# Patient Record
Sex: FEMALE | Race: BLACK OR AFRICAN AMERICAN | URBAN - METROPOLITAN AREA
[De-identification: names, ages, dates, MRNs, and addresses within clinical notes are randomized per-mention and may not be internally consistent; named-entity substitution may affect disease eponyms.]

---

## 2020-01-03 ENCOUNTER — HISTORICAL (OUTPATIENT)
Dept: OBSTETRICS AND GYNECOLOGY | Facility: HOSPITAL | Age: 45
End: 2020-01-03

## 2025-01-31 ENCOUNTER — HOSPITAL ENCOUNTER (EMERGENCY)
Facility: HOSPITAL | Age: 50
Discharge: HOME | End: 2025-01-31
Attending: EMERGENCY MEDICINE
Payer: MEDICAID

## 2025-01-31 VITALS
SYSTOLIC BLOOD PRESSURE: 104 MMHG | OXYGEN SATURATION: 99 % | TEMPERATURE: 98 F | RESPIRATION RATE: 18 BRPM | HEART RATE: 80 BPM | DIASTOLIC BLOOD PRESSURE: 82 MMHG

## 2025-01-31 DIAGNOSIS — R22.1 MASS OF RIGHT SIDE OF NECK: ICD-10-CM

## 2025-01-31 DIAGNOSIS — Z32.02 NEGATIVE PREGNANCY TEST: Primary | ICD-10-CM

## 2025-01-31 LAB
ALBUMIN SERPL-MCNC: 3.5 G/DL (ref 3.5–5)
ALBUMIN/GLOB SERPL: 0.8 RATIO (ref 1.1–2)
ALP SERPL-CCNC: 81 UNIT/L (ref 40–150)
ALT SERPL-CCNC: 6 UNIT/L (ref 0–55)
ANION GAP SERPL CALC-SCNC: 6 MEQ/L
ANISOCYTOSIS BLD QL SMEAR: ABNORMAL
AST SERPL-CCNC: 13 UNIT/L (ref 5–34)
B-HCG FREE SERPL-ACNC: <2.42 MIU/ML
B-HCG UR QL: NEGATIVE
BASOPHILS # BLD AUTO: 0.02 X10(3)/MCL
BASOPHILS NFR BLD AUTO: 0.4 %
BILIRUB SERPL-MCNC: 0.4 MG/DL
BILIRUB UR QL STRIP.AUTO: NEGATIVE
BUN SERPL-MCNC: 11 MG/DL (ref 7–18.7)
CALCIUM SERPL-MCNC: 8.7 MG/DL (ref 8.4–10.2)
CHLORIDE SERPL-SCNC: 110 MMOL/L (ref 98–107)
CLARITY UR: CLEAR
CO2 SERPL-SCNC: 23 MMOL/L (ref 22–29)
COLOR UR AUTO: YELLOW
CREAT SERPL-MCNC: 0.66 MG/DL (ref 0.55–1.02)
CREAT/UREA NIT SERPL: 17
ELLIPTOCYTOSIS (OHS): SLIGHT
EOSINOPHIL # BLD AUTO: 0.14 X10(3)/MCL (ref 0–0.9)
EOSINOPHIL NFR BLD AUTO: 2.5 %
ERYTHROCYTE [DISTWIDTH] IN BLOOD BY AUTOMATED COUNT: 21 % (ref 11.5–17)
GFR SERPLBLD CREATININE-BSD FMLA CKD-EPI: >60 ML/MIN/1.73/M2
GLOBULIN SER-MCNC: 4.3 GM/DL (ref 2.4–3.5)
GLUCOSE SERPL-MCNC: 93 MG/DL (ref 74–100)
GLUCOSE UR QL STRIP: NEGATIVE
HCT VFR BLD AUTO: 32.5 % (ref 37–47)
HGB BLD-MCNC: 9.1 G/DL (ref 12–16)
HGB UR QL STRIP: NEGATIVE
HYPOCHROMIA BLD QL SMEAR: SLIGHT
IMM GRANULOCYTES # BLD AUTO: 0.01 X10(3)/MCL (ref 0–0.04)
IMM GRANULOCYTES NFR BLD AUTO: 0.2 %
KETONES UR QL STRIP: NEGATIVE
LEUKOCYTE ESTERASE UR QL STRIP: NEGATIVE
LYMPHOCYTES # BLD AUTO: 1.31 X10(3)/MCL (ref 0.6–4.6)
LYMPHOCYTES NFR BLD AUTO: 23.8 %
MCH RBC QN AUTO: 17.6 PG (ref 27–31)
MCHC RBC AUTO-ENTMCNC: 28 G/DL (ref 33–36)
MCV RBC AUTO: 62.9 FL (ref 80–94)
MICROCYTES BLD QL SMEAR: SLIGHT
MONOCYTES # BLD AUTO: 0.39 X10(3)/MCL (ref 0.1–1.3)
MONOCYTES NFR BLD AUTO: 7.1 %
NEUTROPHILS # BLD AUTO: 3.63 X10(3)/MCL (ref 2.1–9.2)
NEUTROPHILS NFR BLD AUTO: 66 %
NITRITE UR QL STRIP: NEGATIVE
NRBC BLD AUTO-RTO: 0 %
PH UR STRIP: 5.5 [PH]
PLATELET # BLD AUTO: 284 X10(3)/MCL (ref 130–400)
PLATELET # BLD EST: ADEQUATE 10*3/UL
PMV BLD AUTO: 9.2 FL (ref 7.4–10.4)
POIKILOCYTOSIS BLD QL SMEAR: SLIGHT
POTASSIUM SERPL-SCNC: 3.8 MMOL/L (ref 3.5–5.1)
PROT SERPL-MCNC: 7.8 GM/DL (ref 6.4–8.3)
PROT UR QL STRIP: NEGATIVE
RBC # BLD AUTO: 5.17 X10(6)/MCL (ref 4.2–5.4)
RBC MORPH BLD: ABNORMAL
SODIUM SERPL-SCNC: 139 MMOL/L (ref 136–145)
SP GR UR STRIP.AUTO: >=1.03 (ref 1–1.03)
TSH SERPL-ACNC: 0.86 UIU/ML (ref 0.35–4.94)
UROBILINOGEN UR STRIP-ACNC: 0.2
WBC # BLD AUTO: 5.5 X10(3)/MCL (ref 4.5–11.5)

## 2025-01-31 PROCEDURE — 85025 COMPLETE CBC W/AUTO DIFF WBC: CPT

## 2025-01-31 PROCEDURE — 81025 URINE PREGNANCY TEST: CPT | Performed by: EMERGENCY MEDICINE

## 2025-01-31 PROCEDURE — 99283 EMERGENCY DEPT VISIT LOW MDM: CPT

## 2025-01-31 PROCEDURE — 84702 CHORIONIC GONADOTROPIN TEST: CPT

## 2025-01-31 PROCEDURE — 80053 COMPREHEN METABOLIC PANEL: CPT

## 2025-01-31 PROCEDURE — 81003 URINALYSIS AUTO W/O SCOPE: CPT | Performed by: EMERGENCY MEDICINE

## 2025-01-31 PROCEDURE — 84443 ASSAY THYROID STIM HORMONE: CPT

## 2025-01-31 NOTE — ED PROVIDER NOTES
Encounter Date: 1/31/2025       History     Chief Complaint   Patient presents with    Abdominal Pain     Pt states had positive home pregnancy test 3 weeks ago and has been having lower abd pain with small amount of spotting x 2 days.     See Mdm.     The history is provided by the patient. No  was used.     Review of patient's allergies indicates:   Allergen Reactions    Keflex [cephalexin] Rash    Penicillins Rash    Phenergan plain Rash     No past medical history on file.  No past surgical history on file.  No family history on file.     Review of Systems   Constitutional:  Negative for chills and fever.   Cardiovascular:  Negative for palpitations.   Gastrointestinal:  Negative for constipation, diarrhea, nausea and vomiting.   Genitourinary:  Positive for frequency and pelvic pain. Negative for dysuria, hematuria, vaginal bleeding, vaginal discharge and vaginal pain.   Musculoskeletal:         Neck mass   All other systems reviewed and are negative.      Physical Exam     Initial Vitals [01/31/25 1427]   BP Pulse Resp Temp SpO2   (!) 137/52 81 18 97.9 °F (36.6 °C) 100 %      MAP       --         Physical Exam    Nursing note and vitals reviewed.  Constitutional: She appears well-developed and well-nourished. She is not diaphoretic. No distress.   HENT:   Head: Normocephalic and atraumatic.   Anterior neck right sided swelling v mass approximately 3cm x 2cm, firm, no fluctuance or induration, non-tender, non-pulsatile. Nonmobile. No warmth, erythema.    Cardiovascular:  Normal rate, regular rhythm and normal heart sounds.     Exam reveals no gallop and no friction rub.       No murmur heard.  Pulmonary/Chest: No respiratory distress.   Abdominal: Abdomen is soft. Bowel sounds are normal. She exhibits no distension and no mass. There is no abdominal tenderness.   Bilateral pelvic tenderness to palpation There is no rebound and no guarding.   Genitourinary:    Genitourinary Comments: Deferred  by patient       Neurological: She is alert and oriented to person, place, and time.   Skin: Skin is warm and dry.   Psychiatric: She has a normal mood and affect. Her behavior is normal.         ED Course   Procedures  Labs Reviewed   COMPREHENSIVE METABOLIC PANEL - Abnormal       Result Value    Sodium 139      Potassium 3.8      Chloride 110 (*)     CO2 23      Glucose 93      Blood Urea Nitrogen 11.0      Creatinine 0.66      Calcium 8.7      Protein Total 7.8      Albumin 3.5      Globulin 4.3 (*)     Albumin/Globulin Ratio 0.8 (*)     Bilirubin Total 0.4      ALP 81      ALT 6      AST 13      eGFR >60      Anion Gap 6.0      BUN/Creatinine Ratio 17     CBC WITH DIFFERENTIAL - Abnormal    WBC 5.50      RBC 5.17      Hgb 9.1 (*)     Hct 32.5 (*)     MCV 62.9 (*)     MCH 17.6 (*)     MCHC 28.0 (*)     RDW 21.0 (*)     Platelet 284      MPV 9.2      Neut % 66.0      Lymph % 23.8      Mono % 7.1      Eos % 2.5      Basophil % 0.4      Imm Grans % 0.2      Neut # 3.63      Lymph # 1.31      Mono # 0.39      Eos # 0.14      Baso # 0.02      Imm Gran # 0.01      NRBC% 0.0     BLOOD SMEAR MICROSCOPIC EXAM ([facility]) - Abnormal    RBC Morph Abnormal (*)     Anisocytosis 1+ (*)     Elliptocytosis Slight (*)     Hypochromasia Slight (*)     Microcytosis Slight (*)     Poikilocytosis Slight (*)     Platelets Adequate     URINALYSIS, REFLEX TO URINE CULTURE - Normal    Color, UA Yellow      Appearance, UA Clear      Specific Gravity, UA >=1.030      pH, UA 5.5      Protein, UA Negative      Glucose, UA Negative      Ketones, UA Negative      Blood, UA Negative      Bilirubin, UA Negative      Urobilinogen, UA 0.2      Nitrites, UA Negative      Leukocyte Esterase, UA Negative     HCG QUALITATIVE URINE - Normal    hCG Qualitative, Urine Negative     TSH - Normal    TSH 0.857     HCG, QUANTITATIVE - Normal    Beta HCG Quant <2.42     CBC W/ AUTO DIFFERENTIAL    Narrative:     The following orders were created for panel order CBC  "auto differential.  Procedure                               Abnormality         Status                     ---------                               -----------         ------                     CBC with Differential[[id number]]        Abnormal            Final result                 Please view results for these tests on the individual orders.          Imaging Results    None          Medications - No data to display  Medical Decision Making  PT is a 50 y/o  currently 3 week pregnant female who presents with pelvic pain x3 days. States that she took a home pregnancy test that was positive three weeks ago, LMP end of November. Pelvic pain is a 7/10, comes and goes, feels like a "pressure." Has taken tylenol for the pain without significant relief. Has had associated urinary frequency and nipple sensitivity. Denies hematuria or dysuria. Denies vaginal discharge or bleeding. Did have some spotting about 3 weeks ago but has since resolved.     Pt is also concerned about right-sided neck mass x6 months. States that it has grown slowly over time and bothers her cosmetically. States that it is sometimes painful but "only when she thinks about it." Otherwise denies pain. Denies hx of thyroid problems. Denies URI symptoms. States that there is family history of cancer so wants to be sure it's not cancerous.     Pt not toxic appearing, no acute distress. There is no US available at 3pm on Friday and indication is not indicated in call out list. Discussed further imaging with patient and she states that she does not want to to have an IV or contrast and does not want to wait for results, would rather get imaging done outpatient. No leukocytosis or significant anemia. CMP unremarkable. Urine pregnancy negative, bHCG serum negative. UA without acute abnormalities. TSH wnl. Pt politely declined pelvic exam in ED, would rather have it be done with PCP. Instructed pt to follow-up with PCP for further imaging of neck. Strict ED " precautions given. Pt expressed understanding and was in agreement with the plan.     Amount and/or Complexity of Data Reviewed  Labs: ordered. Decision-making details documented in ED Course.  Radiology: ordered. Decision-making details documented in ED Course.      Additional MDM:   Differential Diagnosis:   Other: The following diagnoses were also considered and will be evaluated: thyroid nodule, lymphadenopathy and pregnancy.            ED Course as of 01/31/25 1549   Fri Jan 31, 2025   1454 hCG Qualitative, Urine: Negative [ME]   1458 NITRITE UA: Negative [ME]   1458 Leukocyte Esterase, UA: Negative [ME]   1514 WBC: 5.50 [ME]   1514 Hemoglobin(!): 9.1 [ME]   1514 Hematocrit(!): 32.5 [ME]   1522 Sodium: 139 [ME]   1523 Potassium: 3.8 [ME]   1523 Chloride(!): 110 [ME]   1523 CO2: 23 [ME]   1523 BUN: 11.0 [ME]   1523 Creatinine: 0.66 [ME]   1523 eGFR: >60 [ME]   1523 Anion Gap: 6.0 [ME]   1542 TSH: 0.857 [ME]      ED Course User Index  [ME] Ora Hudson PA                             Clinical Impression:  Final diagnoses:  [R22.1] Mass of right side of neck  [Z32.02] Negative pregnancy test (Primary)          ED Disposition Condition    Discharge Stable          ED Prescriptions    None       Follow-up Information       Follow up With Specialties Details Why Contact Info    Primary Care Provider  Call in 1 week  [phone]             Ora Hudson PA  01/31/25 1544

## 2025-01-31 NOTE — DISCHARGE INSTRUCTIONS
Negative pregnancy test in ER. Follow-up with primary care for further imaging and workup of neck mass as well as exam. Return with new or worsening symptoms.

## 2025-03-13 ENCOUNTER — HOSPITAL ENCOUNTER (EMERGENCY)
Facility: HOSPITAL | Age: 50
Discharge: HOME | End: 2025-03-13
Attending: INTERNAL MEDICINE
Payer: MEDICAID

## 2025-03-13 VITALS
WEIGHT: 163 LBS | HEART RATE: 91 BPM | HEIGHT: 65 IN | BODY MASS INDEX: 27.16 KG/M2 | OXYGEN SATURATION: 100 % | SYSTOLIC BLOOD PRESSURE: 110 MMHG | TEMPERATURE: 98 F | RESPIRATION RATE: 18 BRPM | DIASTOLIC BLOOD PRESSURE: 68 MMHG

## 2025-03-13 DIAGNOSIS — R22.1 NECK MASS: Primary | ICD-10-CM

## 2025-03-13 PROCEDURE — 99281 EMR DPT VST MAYX REQ PHY/QHP: CPT

## 2025-03-13 NOTE — Clinical Note
"Nori Goodwin" Godwin was seen and treated in our emergency department on 3/13/2025.  She may return to work on 03/14/2025.       If you have any questions or concerns, please don't hesitate to call.       RN    "

## 2025-03-13 NOTE — ED PROVIDER NOTES
Encounter Date: 3/13/2025       History     Chief Complaint   Patient presents with    neck lump     Lump on her neck for 3-4 months     See MDM    The history is provided by the patient. No  was used.     Review of patient's allergies indicates:   Allergen Reactions    Amoxicillin Hives and Shortness Of Breath    Oxycodone-acetaminophen Hives    Keflex [cephalexin] Rash    Penicillins Rash    Phenergan plain Rash     No past medical history on file.  No past surgical history on file.  No family history on file.  Social History[1]  Review of Systems   Constitutional:         Right side neck lump   All other systems reviewed and are negative.      Physical Exam     Initial Vitals [03/13/25 1517]   BP Pulse Resp Temp SpO2   118/72 105 16 98.4 °F (36.9 °C) 98 %      MAP       --         Physical Exam    Nursing note and vitals reviewed.  Constitutional: She appears well-developed and well-nourished.   Eyes: Conjunctivae are normal.   Neck:   Right side neck mass vs lymph node approx 3cm x 2cm. Not red or hot and not tender to touch.    Normal range of motion.  Cardiovascular:    Tachycardia present.         Pulmonary/Chest: No respiratory distress.   Musculoskeletal:         General: Normal range of motion.      Cervical back: Normal range of motion.     Neurological: She is alert and oriented to person, place, and time.   Skin: Skin is warm and dry.   Psychiatric: She has a normal mood and affect.         ED Course   Procedures  Labs Reviewed - No data to display       Imaging Results    None          Medications - No data to display  Medical Decision Making  50 y/o female presents with right side neck mass/lump that has been present for several months now and bothers her occasionally. She states she wanted to come again to get it checked out. She states she was seen here end of January and she hasn't followed up since then. She states no other symptoms and will need a work excuse.     Discussed  that per review her TSH was normal 1/31/25. Offered labs and CT with contrast today. She states that is going to be too long so will follow up with primary care provider. Discussed I will provide number for this     Amount and/or Complexity of Data Reviewed  External Data Reviewed: labs and notes.     Details: Reviewed ER note on 1/31/25 along with labs      Additional MDM:   Differential Diagnosis:   Other: The following diagnoses were also considered and will be evaluated: lymph node, thyroid goiter and mass.                                   Clinical Impression:  Final diagnoses:  [R22.1] Neck mass (Primary)          ED Disposition Condition    Discharge Stable          ED Prescriptions    None       Follow-up Information       Follow up With Specialties Details Why Contact Info    primary care provider  Call in 2 days to get appointment scheduled for follow up on neck lump/mass [phone]               [1]         Cindy Hernandez, TRAVIS  03/13/25 [id number]

## 2025-08-19 ENCOUNTER — HOSPITAL ENCOUNTER (EMERGENCY)
Facility: HOSPITAL | Age: 50
Discharge: HOME | End: 2025-08-19
Attending: EMERGENCY MEDICINE
Payer: COMMERCIAL

## 2025-08-19 VITALS
DIASTOLIC BLOOD PRESSURE: 64 MMHG | BODY MASS INDEX: 27.32 KG/M2 | OXYGEN SATURATION: 100 % | SYSTOLIC BLOOD PRESSURE: 122 MMHG | HEIGHT: 65 IN | RESPIRATION RATE: 16 BRPM | TEMPERATURE: 98 F | WEIGHT: 164 LBS | HEART RATE: 76 BPM

## 2025-08-19 DIAGNOSIS — S80.12XA CONTUSION OF LEFT LEG, INITIAL ENCOUNTER: Primary | ICD-10-CM

## 2025-08-19 PROCEDURE — 99282 EMERGENCY DEPT VISIT SF MDM: CPT
